# Patient Record
Sex: FEMALE | Race: OTHER | NOT HISPANIC OR LATINO | URBAN - METROPOLITAN AREA
[De-identification: names, ages, dates, MRNs, and addresses within clinical notes are randomized per-mention and may not be internally consistent; named-entity substitution may affect disease eponyms.]

---

## 2022-05-20 ENCOUNTER — EMERGENCY (EMERGENCY)
Facility: HOSPITAL | Age: 22
LOS: 1 days | Discharge: HOME | End: 2022-05-20
Attending: EMERGENCY MEDICINE | Admitting: EMERGENCY MEDICINE
Payer: COMMERCIAL

## 2022-05-20 VITALS
HEIGHT: 59 IN | DIASTOLIC BLOOD PRESSURE: 78 MMHG | OXYGEN SATURATION: 100 % | RESPIRATION RATE: 18 BRPM | WEIGHT: 89.95 LBS | HEART RATE: 84 BPM | SYSTOLIC BLOOD PRESSURE: 115 MMHG | TEMPERATURE: 98 F

## 2022-05-20 VITALS
TEMPERATURE: 98 F | OXYGEN SATURATION: 100 % | HEART RATE: 74 BPM | SYSTOLIC BLOOD PRESSURE: 115 MMHG | RESPIRATION RATE: 18 BRPM | DIASTOLIC BLOOD PRESSURE: 76 MMHG

## 2022-05-20 DIAGNOSIS — F17.290 NICOTINE DEPENDENCE, OTHER TOBACCO PRODUCT, UNCOMPLICATED: ICD-10-CM

## 2022-05-20 DIAGNOSIS — Z82.49 FAMILY HISTORY OF ISCHEMIC HEART DISEASE AND OTHER DISEASES OF THE CIRCULATORY SYSTEM: ICD-10-CM

## 2022-05-20 DIAGNOSIS — Z87.19 PERSONAL HISTORY OF OTHER DISEASES OF THE DIGESTIVE SYSTEM: ICD-10-CM

## 2022-05-20 DIAGNOSIS — Z20.822 CONTACT WITH AND (SUSPECTED) EXPOSURE TO COVID-19: ICD-10-CM

## 2022-05-20 DIAGNOSIS — R07.89 OTHER CHEST PAIN: ICD-10-CM

## 2022-05-20 LAB
ALBUMIN SERPL ELPH-MCNC: 5.1 G/DL — HIGH (ref 3.3–5)
ALP SERPL-CCNC: 49 U/L — SIGNIFICANT CHANGE UP (ref 40–120)
ALT FLD-CCNC: 12 U/L — SIGNIFICANT CHANGE UP (ref 10–45)
ANION GAP SERPL CALC-SCNC: 12 MMOL/L — SIGNIFICANT CHANGE UP (ref 5–17)
AST SERPL-CCNC: 14 U/L — SIGNIFICANT CHANGE UP (ref 10–40)
BASOPHILS # BLD AUTO: 0.07 K/UL — SIGNIFICANT CHANGE UP (ref 0–0.2)
BASOPHILS NFR BLD AUTO: 0.9 % — SIGNIFICANT CHANGE UP (ref 0–2)
BILIRUB SERPL-MCNC: 0.6 MG/DL — SIGNIFICANT CHANGE UP (ref 0.2–1.2)
BUN SERPL-MCNC: 12 MG/DL — SIGNIFICANT CHANGE UP (ref 7–23)
CALCIUM SERPL-MCNC: 10.2 MG/DL — SIGNIFICANT CHANGE UP (ref 8.4–10.5)
CHLORIDE SERPL-SCNC: 101 MMOL/L — SIGNIFICANT CHANGE UP (ref 96–108)
CO2 SERPL-SCNC: 26 MMOL/L — SIGNIFICANT CHANGE UP (ref 22–31)
CREAT SERPL-MCNC: 0.66 MG/DL — SIGNIFICANT CHANGE UP (ref 0.5–1.3)
EGFR: 129 ML/MIN/1.73M2 — SIGNIFICANT CHANGE UP
EOSINOPHIL # BLD AUTO: 0.1 K/UL — SIGNIFICANT CHANGE UP (ref 0–0.5)
EOSINOPHIL NFR BLD AUTO: 1.3 % — SIGNIFICANT CHANGE UP (ref 0–6)
GLUCOSE SERPL-MCNC: 100 MG/DL — HIGH (ref 70–99)
HCG SERPL-ACNC: 0 MIU/ML — SIGNIFICANT CHANGE UP
HCT VFR BLD CALC: 41.6 % — SIGNIFICANT CHANGE UP (ref 34.5–45)
HGB BLD-MCNC: 13.5 G/DL — SIGNIFICANT CHANGE UP (ref 11.5–15.5)
IMM GRANULOCYTES NFR BLD AUTO: 0.1 % — SIGNIFICANT CHANGE UP (ref 0–1.5)
LYMPHOCYTES # BLD AUTO: 2.02 K/UL — SIGNIFICANT CHANGE UP (ref 1–3.3)
LYMPHOCYTES # BLD AUTO: 26.1 % — SIGNIFICANT CHANGE UP (ref 13–44)
MCHC RBC-ENTMCNC: 28.7 PG — SIGNIFICANT CHANGE UP (ref 27–34)
MCHC RBC-ENTMCNC: 32.5 GM/DL — SIGNIFICANT CHANGE UP (ref 32–36)
MCV RBC AUTO: 88.3 FL — SIGNIFICANT CHANGE UP (ref 80–100)
MONOCYTES # BLD AUTO: 0.54 K/UL — SIGNIFICANT CHANGE UP (ref 0–0.9)
MONOCYTES NFR BLD AUTO: 7 % — SIGNIFICANT CHANGE UP (ref 2–14)
NEUTROPHILS # BLD AUTO: 5.01 K/UL — SIGNIFICANT CHANGE UP (ref 1.8–7.4)
NEUTROPHILS NFR BLD AUTO: 64.6 % — SIGNIFICANT CHANGE UP (ref 43–77)
NRBC # BLD: 0 /100 WBCS — SIGNIFICANT CHANGE UP (ref 0–0)
PLATELET # BLD AUTO: 293 K/UL — SIGNIFICANT CHANGE UP (ref 150–400)
POTASSIUM SERPL-MCNC: 4.1 MMOL/L — SIGNIFICANT CHANGE UP (ref 3.5–5.3)
POTASSIUM SERPL-SCNC: 4.1 MMOL/L — SIGNIFICANT CHANGE UP (ref 3.5–5.3)
PROT SERPL-MCNC: 7.9 G/DL — SIGNIFICANT CHANGE UP (ref 6–8.3)
RBC # BLD: 4.71 M/UL — SIGNIFICANT CHANGE UP (ref 3.8–5.2)
RBC # FLD: 12.8 % — SIGNIFICANT CHANGE UP (ref 10.3–14.5)
SARS-COV-2 RNA SPEC QL NAA+PROBE: NEGATIVE — SIGNIFICANT CHANGE UP
SODIUM SERPL-SCNC: 139 MMOL/L — SIGNIFICANT CHANGE UP (ref 135–145)
TROPONIN T SERPL-MCNC: <0.01 NG/ML — SIGNIFICANT CHANGE UP (ref 0–0.01)
WBC # BLD: 7.75 K/UL — SIGNIFICANT CHANGE UP (ref 3.8–10.5)
WBC # FLD AUTO: 7.75 K/UL — SIGNIFICANT CHANGE UP (ref 3.8–10.5)

## 2022-05-20 PROCEDURE — 84484 ASSAY OF TROPONIN QUANT: CPT

## 2022-05-20 PROCEDURE — 36415 COLL VENOUS BLD VENIPUNCTURE: CPT

## 2022-05-20 PROCEDURE — 71046 X-RAY EXAM CHEST 2 VIEWS: CPT | Mod: 26

## 2022-05-20 PROCEDURE — 84702 CHORIONIC GONADOTROPIN TEST: CPT

## 2022-05-20 PROCEDURE — 99284 EMERGENCY DEPT VISIT MOD MDM: CPT | Mod: 25

## 2022-05-20 PROCEDURE — 85025 COMPLETE CBC W/AUTO DIFF WBC: CPT

## 2022-05-20 PROCEDURE — 93005 ELECTROCARDIOGRAM TRACING: CPT

## 2022-05-20 PROCEDURE — 99285 EMERGENCY DEPT VISIT HI MDM: CPT | Mod: 25

## 2022-05-20 PROCEDURE — 71046 X-RAY EXAM CHEST 2 VIEWS: CPT | Mod: 26,77

## 2022-05-20 PROCEDURE — 87635 SARS-COV-2 COVID-19 AMP PRB: CPT

## 2022-05-20 PROCEDURE — 71046 X-RAY EXAM CHEST 2 VIEWS: CPT

## 2022-05-20 PROCEDURE — 80053 COMPREHEN METABOLIC PANEL: CPT

## 2022-05-20 RX ORDER — CYCLOBENZAPRINE HYDROCHLORIDE 10 MG/1
1 TABLET, FILM COATED ORAL
Qty: 0 | Refills: 0 | DISCHARGE

## 2022-05-20 NOTE — ED PROVIDER NOTE - CLINICAL SUMMARY MEDICAL DECISION MAKING FREE TEXT BOX
Intermittent CP, asymptomatic curently. Low risk for ACS, vaccinated for Covid consider myocarditis though suspicion low. Nonischemic EKG. Clinically very low susp for PE, and no signs of DVT.  PERC negative. Discussed utility of d-dimer, shared decision making with patient; false positive would obligate a CTA of chest - agreed to forego d-dimer. Will check troponin and CXR.

## 2022-05-20 NOTE — ED PROVIDER NOTE - NSFOLLOWUPINSTRUCTIONS_ED_ALL_ED_FT
Take famotidine (Pepcid) as directed.  Follow up with your doctor after the weekend.  You may need referral to a cardiologist for echocardiogram (ultrasound of heart).     Return to the Emergency Department if you have any new or worsening symptoms, or if you have any concerns.  =============================    Chest Pain    WHAT YOU NEED TO KNOW:    Chest pain can be caused by a range of conditions, from not serious to life-threatening. Chest pain can be a symptom of a digestive problem, such as acid reflux or a stomach ulcer. An anxiety attack or a strong emotion, such as anger, can also cause chest pain. Infection, inflammation, or a fracture in the bones or cartilage in your chest can cause pain or discomfort. Sometimes chest pain or pressure is caused by poor blood flow to your heart (angina). Chest pain may also be caused by life-threatening conditions such as a heart attack or blood clot in your lungs.    DISCHARGE INSTRUCTIONS:    Call your local emergency number (911 in the ) or have someone call if:   •You have any of the following signs of a heart attack: ?Squeezing, pressure, or pain in your chest      ?You may also have any of the following: ?Discomfort or pain in your back, neck, jaw, stomach, or arm      ?Shortness of breath      ?Nausea or vomiting      ?Lightheadedness or a sudden cold sweat            Return to the emergency department if:   •You have chest discomfort that gets worse, even with medicine.      •You cough or vomit blood.      •Your bowel movements are black or bloody.      •You cannot stop vomiting, or it hurts to swallow.      Call your doctor if:   •You have questions or concerns about your condition or care.          Medicines:   •Medicines may be given to treat the cause of your chest pain. Examples include pain medicine, anxiety medicine, or medicines to increase blood flow to your heart.      •Do not take certain medicines without asking your healthcare provider first. These include NSAIDs, herbal or vitamin supplements, and hormones, such as estrogen or progestin.      •Take your medicine as directed. Contact your healthcare provider if you think your medicine is not helping or if you have side effects. Tell him or her if you are allergic to any medicine. Keep a list of the medicines, vitamins, and herbs you take. Include the amounts, and when and why you take them. Bring the list or the pill bottles to follow-up visits. Carry your medicine list with you in case of an emergency.      Healthy living tips: If the cause of your chest pain is known, your healthcare provider will give you specific guidelines to follow. The following are general healthy guidelines:  •Do not smoke. Nicotine and other chemicals in cigarettes and cigars can cause lung and heart damage. Ask your healthcare provider for information if you currently smoke and need help to quit. E-cigarettes or smokeless tobacco still contain nicotine. Talk to your healthcare provider before you use these products.      •Choose a variety of healthy foods as often as possible. Include fresh, frozen, or canned fruits and vegetables. Also include low-fat dairy products, fish, chicken (without skin), and lean meats. Your healthcare provider or a dietitian can help you create meal plans. You may need to avoid certain foods or drinks if your pain is caused by a digestion problem.        •Lower your sodium (salt) intake. Limit foods that are high in sodium, such as canned foods, salty snacks, and cold cuts. If you add salt when you cook food, do not add more at the table. Choose low-sodium canned foods as much as possible.        •Drink plenty of water every day. Water helps your body to control your temperature and blood pressure. Ask your healthcare provider how much water you should drink every day.      •Ask about activity. Your healthcare provider will tell you which activities to limit or avoid. Ask when you can drive, return to work, and have sex. Ask about the best exercise plan for you.      •Maintain a healthy weight. Ask your healthcare provider what a healthy weight is for you. Ask him or her to help you create a safe weight loss plan if you are overweight.      •Ask about vaccines you may need. Your healthcare provider can tell you which vaccines you need, and when to get them. The following vaccines help prevent diseases that can become serious for a person with a heart condition:?The influenza (flu) vaccine is given each year. Get a flu vaccine as soon as recommended, usually in September or October.      ?The pneumonia vaccine is usually given every 5 years. Your healthcare provider may recommend the pneumonia vaccine if you are 65 or older.      ?COVID-19 vaccines are given to adults as a shot in 1 or 2 doses.   Vaccination is recommended for all adults. A booster (additional) dose is also recommended for all adults. A booster helps your immune system continue to protect against severe COVID-19. The booster can be a different brand of the COVID-19 vaccine than you originally received. The timing for the booster depends on the type of vaccine you received:?1-dose vaccine: The booster is given at least 2 months after you received the vaccine.      ?2-dose vaccine: The booster is given at least 5 to 6 months after the second dose. A second booster is recommended for all adults 50 or older and for immunocompromised adults 18 or older. Your healthcare provider will tell you when to get this booster.      Follow up with your doctor within 72 hours, or as directed: You may need to return for more tests to find the cause of your chest pain. You may be referred to a specialist, such as a cardiologist or gastroenterologist. Write down your questions so you remember to ask them during your visits.

## 2022-05-20 NOTE — ED ADULT TRIAGE NOTE - CHIEF COMPLAINT QUOTE
22 y/o female c/o intermittent chest pain and SOB for a few weeks, hx of gastritis, pt denies any cardiac hx. Smoker.

## 2022-05-20 NOTE — ED PROVIDER NOTE - ATTENDING APP SHARED VISIT CONTRIBUTION OF CARE
Pt is a 22yo f, h/o gastritis, who p/w intermittent cp x 2 months, described as sharp, to sternal/ epig region, 30 sec at a time max, no allev/ aggrav factors. No cough/ hemoptysis, leg pain, swelling, palp, dizziness, increased sob. No risk factors for pe/ cad. AVSS. PE as above. EKG non-ischemic, no signs of pericarditis. CXR neg for acute findings. Plan for labs and if wnl, pt unlikely to have acs. Is perc neg.

## 2022-05-20 NOTE — ED PROVIDER NOTE - NS ED ATTENDING STATEMENT MOD
This was a shared visit with the LUCIAN. I reviewed and verified the documentation and independently performed the documented:

## 2022-05-20 NOTE — ED PROVIDER NOTE - PATIENT PORTAL LINK FT
You can access the FollowMyHealth Patient Portal offered by [facility] by registering at the following website: [email or web address]. By joining [employer]’s FollowMyHealth portal, you will also be able to view your health information using other applications (apps) compatible with our system.

## 2022-05-20 NOTE — ED PROVIDER NOTE - PHYSICAL EXAMINATION
CONSTITUTIONAL: NAD   SKIN: Normal color and turgor.    HEAD: NC/AT.  EYES: Conjunctiva clear. EOMI. PERRL.    ENT: Airway clear. Normal voice.   RESPIRATORY:  Normal work of breathing. Lungs CTAB.  CARDIOVASCULAR:  RRR, S1S2. No M/R/G.      GI:  Abdomen soft, nontender.    MSK: Neck supple.  No edema.  No muscular tenderness. No joint swelling or ROM limitation.  NEURO: Alert; CN: grossly intact. Speech clear.  LEWIS. Ambulating well.

## 2022-05-20 NOTE — ED ADULT NURSE NOTE - OBJECTIVE STATEMENT
21y Female c/o chest tightness. Pt reports intermittent chest tightness/discomfort x 2 months. Pmhx gastritis. Current smoker, not on birth-control.  Pt reports pain feels sharp, started on the R chest, but sometimes can be on epigastric area, and lately has been on the L chest over the heart. Pt endorses pain worsens when taking a deep breath, and when under stress. Pt denies dizziness, SOB, F/C, N/V/D.

## 2022-05-20 NOTE — ED ADULT NURSE NOTE - CHIEF COMPLAINT
....TRANSFER - IN REPORT:    Verbal report received from  [facility] on [facility]  being received from GI lab. Report consisted of patients Situation, Background, Assessment and   Recommendations(SBAR). Information from the following report was reviewed with the receiving nurse. Opportunity for questions and clarification was provided. Assessment completed upon patients arrival to unit and care assumed. The patient is a 21y Female complaining of chest pain.

## 2022-05-20 NOTE — ED PROVIDER NOTE - OBJECTIVE STATEMENT
20 y/o F, PMHx of gastritis, presents to ED with intermittent episodes of chest pain x2 months. Pt describes pain as sharp, sometimes in epigastric region, sometimes in sternal region. She states her episodes occurs sporadically, sometimes few days, and sometimes 1-2 a day. Her pain is brief, lasting about 20 secs at time, with no sxs in between. She has no recent change in exercise tolerance. She denies cough, hemoptysis, fevers, chills, radiation of pain to back or arms. She has no hx of DVT, PE, heart disease, DM, HLD or asthma. Pt is not obese. Her FH is significant for HLD. She has FH of premature heart disease or sudden death in first degree relatives. Pt vapes nicotine and smokes marijuana. She denies cocaine or other hard drug use. She does not take OCP or estrogen. Pt denies any recent embolization. She denies hemoptysis or malignancy. Pt is currently vaccinated x2 for COVID-19, for Moderna. Pt is asymptomatic at time of exam. 20 y/o F, PMHx of gastritis, presents to ED with intermittent episodes of chest pain x2 months. Pt describes pain as sharp, sometimes in epigastric region, sometimes in sternal region. She states her episodes occurs sporadically, sometimes few days, and sometimes 1-2 a day. Her pain is brief, lasting about 20 secs at time, with no sxs in between. She has no recent change in exercise tolerance. She denies cough, hemoptysis, fevers, chills, radiation of pain to back or arms. She has no hx of DVT, PE, heart disease, DM, HLD or asthma. Pt is not obese. Her FH is significant for HLD (sister). She has no FH of premature heart disease or sudden death in first degree relatives. Pt vapes nicotine and smokes marijuana. She denies cocaine or other hard drug use. She does not take OCP/estrogen. Pt denies any recent embolization. She denies hemoptysis or malignancy. Pt is currently vaccinated x2 for COVID-19, for Moderna. Pt is asymptomatic at time of exam.

## 2022-06-30 ENCOUNTER — EMERGENCY (EMERGENCY)
Facility: HOSPITAL | Age: 22
LOS: 1 days | Discharge: HOME | End: 2022-06-30
Attending: STUDENT IN AN ORGANIZED HEALTH CARE EDUCATION/TRAINING PROGRAM | Admitting: STUDENT IN AN ORGANIZED HEALTH CARE EDUCATION/TRAINING PROGRAM
Payer: COMMERCIAL

## 2022-06-30 VITALS
RESPIRATION RATE: 18 BRPM | WEIGHT: 89.95 LBS | HEART RATE: 79 BPM | TEMPERATURE: 99 F | HEIGHT: 59 IN | DIASTOLIC BLOOD PRESSURE: 83 MMHG | SYSTOLIC BLOOD PRESSURE: 130 MMHG | OXYGEN SATURATION: 99 %

## 2022-06-30 PROCEDURE — 71046 X-RAY EXAM CHEST 2 VIEWS: CPT | Mod: 26

## 2022-06-30 PROCEDURE — 99283 EMERGENCY DEPT VISIT LOW MDM: CPT | Mod: 25

## 2022-06-30 PROCEDURE — 93005 ELECTROCARDIOGRAM TRACING: CPT

## 2022-06-30 PROCEDURE — 99284 EMERGENCY DEPT VISIT MOD MDM: CPT

## 2022-06-30 PROCEDURE — 71046 X-RAY EXAM CHEST 2 VIEWS: CPT

## 2022-06-30 RX ORDER — ACETAMINOPHEN 500 MG
650 TABLET ORAL ONCE
Refills: 0 | Status: COMPLETED | OUTPATIENT
Start: 2022-06-30 | End: 2022-06-30

## 2022-06-30 RX ADMIN — Medication 30 MILLILITER(S): at 21:44

## 2022-06-30 NOTE — ED PROVIDER NOTE - NSFOLLOWUPINSTRUCTIONS_ED_ALL_ED_FT
Follow up with your PCP in 24-48 hours.   Follow up with cardiology and gastroenterology, as discussed.   May take over the counter omeprazole as directed on the bottle for reflux/gastritis symptoms.   Return to the ER if you develop any new or worsening symptoms such as worsening chest pain, shortness of breath, numbness, weakness, abdominal pain, nausea, vomiting, or visual changes.

## 2022-06-30 NOTE — ED PROVIDER NOTE - WR ORDER ID 1
Possible sinus infection.  The doxycycline would also cover lung infections. It has antiinflammatory properties and may help that way also.    The urine is being sent for culture, although we did not see any pus or blood under the microscope.  If a bacteria strain grows, we will call you and adjust medications if needed.    As far as I can tell, no heart problem.    Please follow up with Dr Rincon if not improving as expected over the next 3-4 days on antibiotic. I ordered a long course since the usual course did not cure it.    Withdrawal from venlafaxine can cause headache and muscle aches.  Can try taking it again and see if some symptoms go away.    Best Wishes!      
[id number]

## 2022-06-30 NOTE — ED PROVIDER NOTE - PATIENT PORTAL LINK FT
You can access the FollowMyHealth Patient Portal offered by [facility] by registering at the following website: [email or web address]. By joining [email or web address]’s FollowMyHealth portal, you will also be able to view your health information using other applications (apps) compatible with our system.

## 2022-06-30 NOTE — ED ADULT NURSE NOTE - OBJECTIVE STATEMENT
Pt presents to the ED with c/o on and off midchest pain and left arm pain x a few months. Denies fever, vomiting, or shortness of breath.

## 2022-06-30 NOTE — ED ADULT TRIAGE NOTE - CHIEF COMPLAINT QUOTE
Pt c/o midchest pain for months that comes and goes and left arm pain that come and goes for week. pt stated she has an appointment for echocardiogram on Sunday.

## 2022-06-30 NOTE — ED PROVIDER NOTE - CLINICAL SUMMARY MEDICAL DECISION MAKING FREE TEXT BOX
21 year old female with gastritis presents to ED complaining of intermittent epigastric/chest pain for several months. Patient well-appearing. Exam unremarkable. Likely gastritis, low suspicion for PE, and patient is PERC negative. Will screen with EKG and X-Ray and reassess.

## 2022-06-30 NOTE — ED PROVIDER NOTE - OBJECTIVE STATEMENT
21 year old female with gastritis presents to ED complaining of intermittent epigastric/chest pain for several months. She notes that the pain sometimes goes down her left arm, and is worse after eating. She denies other symptoms, including fever, cough, shortness of breath, vomiting, or lower extremity swelling. She further denies recent surgery, immobilization, trauma, or history of DVT or PE. Patient states that she went to [facility] for the same symptoms on 5/20, at which time her troponin, EKG, and X-Ray were within normal limits. She confirms that she has follow-up appointments with cardiology and GI within the next week. NKDA.

## 2022-06-30 NOTE — ED ADULT NURSE NOTE - NSIMPLEMENTINTERV_GEN_ALL_ED
Implemented All Universal Safety Interventions:  [place] to call system. Call bell, personal items and telephone within reach. Instruct patient to call for assistance. Room bathroom lighting operational. Non-slip footwear when patient is off stretcher. Physically safe environment: no spills, clutter or unnecessary equipment. Stretcher in lowest position, wheels locked, appropriate side rails in place.

## 2022-06-30 NOTE — ED PROVIDER NOTE - HIV OFFER
Implanted port flushed with 10ml 0.9% sodium chloride followed by Heparin 500 units/5ml.  Needle removed from port..  Patient left ambulatory.  
Previously Declined (within the last year)

## 2022-07-01 PROBLEM — K29.70 GASTRITIS, UNSPECIFIED, WITHOUT BLEEDING: Chronic | Status: ACTIVE | Noted: 2022-05-20

## 2022-11-11 NOTE — ED ADULT NURSE NOTE - NSSEPSISSUSPECTED_ED_A_ED
[Symptom and Test Evaluation] : symptom and test evaluation [CV Risk Factors and Non-Cardiac Disease] : CV risk factors and non-cardiac disease [Coronary Artery Disease] : coronary artery disease No

## 2024-01-29 NOTE — ED ADULT NURSE NOTE - CHPI ED NUR DURATION
PAST SURGICAL HISTORY:  H/O aortic valve replacement     H/O bilateral cataract extraction     History of appendectomy     History of tonsillectomy     
month(s)

## 2024-09-05 NOTE — ED PROVIDER NOTE - RADIATION
Patient seen. cMRI results reviewed. On further review of Leila's outpatient treadmill stress test, she did have transient type I Brugada EKG changes at peak exercise and beginning of recovery, which quickly resolved. Given above finding, negative cor CTA and cMRI, most likely diagnosis is Brugada syndrome.    - Will forgo EP study  - Discussed ICD for secondary prevention. Extensively discussed benefits and risks of traditional/transvenous ICD vs. S-ICD vs. EV ICD  - Given her age, I recommended EV ICD  - They would like more time to discuss and will get back to me tomorrow  - Also discussed exercise restriction, genetic testing, and further evaluation of family members darnell her brother  - Procedure will likely be next week, no need to keep NPO p MN arm
